# Patient Record
Sex: MALE | Race: WHITE | NOT HISPANIC OR LATINO | Employment: OTHER | URBAN - METROPOLITAN AREA
[De-identification: names, ages, dates, MRNs, and addresses within clinical notes are randomized per-mention and may not be internally consistent; named-entity substitution may affect disease eponyms.]

---

## 2022-05-18 ENCOUNTER — NURSING HOME VISIT (OUTPATIENT)
Dept: FAMILY MEDICINE CLINIC | Facility: CLINIC | Age: 82
End: 2022-05-18

## 2022-05-18 VITALS
SYSTOLIC BLOOD PRESSURE: 107 MMHG | TEMPERATURE: 96.6 F | HEIGHT: 69 IN | HEART RATE: 94 BPM | DIASTOLIC BLOOD PRESSURE: 56 MMHG | WEIGHT: 128 LBS | BODY MASS INDEX: 18.96 KG/M2

## 2022-05-18 DIAGNOSIS — K21.9 CHRONIC GERD: ICD-10-CM

## 2022-05-18 DIAGNOSIS — D50.8 OTHER IRON DEFICIENCY ANEMIA: ICD-10-CM

## 2022-05-18 DIAGNOSIS — E78.5 DYSLIPIDEMIA: ICD-10-CM

## 2022-05-18 DIAGNOSIS — D63.8 ANEMIA IN OTHER CHRONIC DISEASES CLASSIFIED ELSEWHERE: ICD-10-CM

## 2022-05-18 DIAGNOSIS — F03.90 DEMENTIA WITHOUT BEHAVIORAL DISTURBANCE, UNSPECIFIED DEMENTIA TYPE (HCC): ICD-10-CM

## 2022-05-18 DIAGNOSIS — I10 PRIMARY HYPERTENSION: Primary | ICD-10-CM

## 2022-05-18 DIAGNOSIS — I73.9 PAD (PERIPHERAL ARTERY DISEASE) (HCC): ICD-10-CM

## 2022-05-18 DIAGNOSIS — Z78.9 TAKES DIETARY SUPPLEMENTS: ICD-10-CM

## 2022-05-18 DIAGNOSIS — N18.9 CHRONIC KIDNEY DISEASE, UNSPECIFIED CKD STAGE: ICD-10-CM

## 2022-05-18 DIAGNOSIS — F32.89 OTHER DEPRESSION: ICD-10-CM

## 2022-05-18 PROBLEM — D64.9 ABSOLUTE ANEMIA: Status: ACTIVE | Noted: 2022-05-18

## 2022-05-18 PROBLEM — F32.A DEPRESSION: Status: ACTIVE | Noted: 2022-05-18

## 2022-05-18 PROBLEM — D64.9 ABSOLUTE ANEMIA: Status: RESOLVED | Noted: 2022-05-18 | Resolved: 2022-05-18

## 2022-05-18 PROCEDURE — 99326 PR DOMICIL/REST HOME NEW PT HI-MOD SEVER 45 MINUTES: CPT | Performed by: FAMILY MEDICINE

## 2022-05-18 RX ORDER — LISINOPRIL 2.5 MG/1
2.5 TABLET ORAL DAILY
Qty: 90 TABLET | Refills: 3
Start: 2022-05-18

## 2022-05-18 RX ORDER — CILOSTAZOL 50 MG/1
50 TABLET ORAL 2 TIMES DAILY
Qty: 60 TABLET | Refills: 0
Start: 2022-05-18

## 2022-05-18 RX ORDER — FERROUS SULFATE 325(65) MG
325 TABLET ORAL
Qty: 90 TABLET | Refills: 0
Start: 2022-05-18

## 2022-05-18 RX ORDER — OMEPRAZOLE 10 MG/1
10 CAPSULE, DELAYED RELEASE ORAL DAILY
Qty: 30 CAPSULE | Refills: 0
Start: 2022-05-18

## 2022-05-18 RX ORDER — ATORVASTATIN CALCIUM 40 MG/1
40 TABLET, FILM COATED ORAL DAILY
Qty: 90 TABLET | Refills: 2
Start: 2022-05-18 | End: 2023-02-12

## 2022-05-18 RX ORDER — AMLODIPINE BESYLATE 5 MG/1
5 TABLET ORAL DAILY
Qty: 30 TABLET | Refills: 0
Start: 2022-05-02 | End: 2022-06-01

## 2022-05-18 RX ORDER — FOLIC ACID 1 MG/1
1 TABLET ORAL DAILY
Qty: 90 TABLET | Refills: 3
Start: 2022-05-18

## 2022-05-18 NOTE — PROGRESS NOTES
Nursing Home Visit    NAME: Jose De Jesus Wise  AGE: 80 y o  SEX: male [id number]    DATE OF ENCOUNTER: 5/18/2022    Nursing home/assisted living [address]: [address]  Patient room number: 110  Code status:DNR      Assessment and Plan     Problem List Items Addressed This Visit        Digestive    Chronic GERD     Patient denies any symptoms currently  Continue Omeprazole 10 mg daily  Relevant Medications    omeprazole (PriLOSEC) 10 mg delayed release capsule       Cardiovascular and Mediastinum    Primary hypertension - Primary     Well controlled  Continue current regimen with amlodipine 5 mg qd and lisinopril 2 5 mg qd  Relevant Medications    lisinopril (ZESTRIL) 2 5 mg tablet    amLODIPine (NORVASC) 5 mg tablet    PAD (peripheral artery disease) ([facility])     No claudication currently  Continue cilostazol           Relevant Medications    cilostazol (PLETAL) 50 mg tablet       Nervous and Auditory    Dementia without behavioral disturbance ([facility])     Patient was diagnosed with dementia as per wife  No record in chart  Will evaluate further next visit  TSH and B12 ordered today  Relevant Medications    sertraline (Zoloft) 50 mg tablet       Genitourinary    Chronic kidney disease     No results found for: EGFR, CREATININE   Will order CMP today  CKD was noted in [facility] chart  Other    RESOLVED: Anemia in other chronic diseases classified elsewhere    Relevant Medications    folic acid ( Folic Acid) 1 mg tablet    ferrous sulfate (FeroSul) 325 (65 Fe) mg tablet    RESOLVED: Absolute anemia    Relevant Medications    folic acid (KP Folic Acid) 1 mg tablet    ferrous sulfate (FeroSul) 325 (65 Fe) mg tablet    Depression     Stable    Continue Sertraline 50 mg qd           Relevant Medications    sertraline (Zoloft) 50 mg tablet    Takes dietary supplements    Relevant Medications    folic acid (KP Folic Acid) 1 mg tablet    Dyslipidemia    Relevant Medications atorvastatin (LIPITOR) 40 mg tablet          No orders of the defined types were placed in this encounter  Chief Complaint     Headache and left eye pain    History of Present Illness     Patient is seen today during nursing home visit  He was transferred of care to [facility] on 5/2/2022  He was admitted to [place] from 4/14/22 - 4/20/2022 for sepsis 2/2 to [facility]  He has Hx of HTN, CKD, chronic diastolic HF, AAA, PAD, TIA, CAD s/p bypass graft, HLD, depression per chart review  His wife reports he fell and hit his head 2 or 3 months ago when CT head showed minor hemorrhage  Today, he c/o HA on left forehead around 4pm every day, pain in left eye for which he started ciprofloxacin eye drop yesterday  He ambulates with his walker, doing PT  He states the room is nice, the food here is excellent  However, he states he wants to go home to help his wife and said he wanted to talk to his PT, Nik Jeter to get him out of here  He is able to recall that he lived with his wife whose name is [address] [place] [address]  The following portions of the patient's history were reviewed and updated as appropriate: allergies, current medications, past family history, past medical history, past social history, past surgical history and problem list     Review of Systems     Review of Systems   Constitutional: Negative for appetite change and fever  HENT: Negative for rhinorrhea and trouble swallowing  Eyes: Positive for pain (left eye)  Negative for discharge and visual disturbance  Respiratory: Negative for cough and shortness of breath  Cardiovascular: Positive for leg swelling (left foot)  Negative for chest pain  Gastrointestinal: Negative for abdominal pain  Genitourinary: Negative for difficulty urinating  Musculoskeletal: Negative for arthralgias and gait problem  Neurological: Positive for headaches (left forehead)  Negative for weakness  Hematological: Does not bruise/bleed easily     Psychiatric/Behavioral: Negative for agitation, confusion and sleep disturbance  Active Problem List     Patient Active Problem List   Diagnosis    Primary hypertension    Chronic GERD    Depression    Takes dietary supplements    PAD (peripheral artery disease) (HCC)    Dyslipidemia    Chronic kidney disease    Dementia without behavioral disturbance (HCC)    Iron deficiency anemia secondary to inadequate dietary iron intake         Current Medications   Medications reviewed and updated in facility chart  Current Outpatient Medications:     amLODIPine (NORVASC) 5 mg tablet, Take 1 tablet (5 mg total) by mouth in the morning , Disp: 30 tablet, Rfl: 0    atorvastatin (LIPITOR) 40 mg tablet, Take 1 tablet (40 mg total) by mouth in the morning , Disp: 90 tablet, Rfl: 2    cilostazol (PLETAL) 50 mg tablet, Take 1 tablet (50 mg total) by mouth in the morning and 1 tablet (50 mg total) in the evening , Disp: 60 tablet, Rfl: 0    ferrous sulfate (FeroSul) 325 (65 Fe) mg tablet, Take 1 tablet (325 mg total) by mouth daily with breakfast, Disp: 90 tablet, Rfl: 0    folic acid (KP Folic Acid) 1 mg tablet, Take 1 tablet (1 mg total) by mouth in the morning , Disp: 90 tablet, Rfl: 3    lisinopril (ZESTRIL) 2 5 mg tablet, Take 1 tablet (2 5 mg total) by mouth in the morning , Disp: 90 tablet, Rfl: 3    omeprazole (PriLOSEC) 10 mg delayed release capsule, Take 1 capsule (10 mg total) by mouth in the morning , Disp: 30 capsule, Rfl: 0    sertraline (Zoloft) 50 mg tablet, Take 1 tablet (50 mg total) by mouth in the morning , Disp: 30 tablet, Rfl: 0     Objective     /56   Pulse 94   Temp (!) 96 6 °F (35 9 °C)   Ht 5' 9" (1 753 m)   Wt 58 1 kg (128 lb)   BMI 18 90 kg/m²     Physical Exam  Constitutional:       General: He is not in acute distress  Appearance: Normal appearance  He is well-developed  He is not ill-appearing  HENT:      Head: Normocephalic  Comments: A small abrasion on mid-forehead   Small laceration left temporal head with no active bleeding  Right Ear: External ear normal       Left Ear: External ear normal       Nose: Nose normal    Eyes:      Pupils: Pupils are equal, round, and reactive to light  Cardiovascular:      Rate and Rhythm: Normal rate and regular rhythm  Pulses: Normal pulses  Heart sounds: Normal heart sounds  Pulmonary:      Effort: Pulmonary effort is normal       Breath sounds: Normal breath sounds  No rales  Abdominal:      General: There is no distension  Palpations: Abdomen is soft  Tenderness: There is no abdominal tenderness  Musculoskeletal:         General: Swelling (mild swelling in left foot with no warmth, erythema, nor tenderness) present  Normal range of motion  Right lower leg: No edema  Left lower leg: No edema  Skin:     General: Skin is warm  Findings: No bruising or rash  Neurological:      General: No focal deficit present  Mental Status: He is alert  Motor: No weakness  Comments: Oriented to person and place, no time  Left pupil slightly dilated compared with the right (3mm vs 2 mm), reactive to light  Psychiatric:         Behavior: Behavior normal          Pertinent Laboratory/Diagnostic Studies:  No results found

## 2022-05-18 NOTE — LETTER
May 18, 2022     ron MIRAMONTES    Patient: Gayle Estrella   YOB: 1940   Date of Visit: 5/18/2022         Thank you for referring Gayle Estrella to me for evaluation  Below are my notes for this consultation  If you have questions, please do not hesitate to call me  I look forward to following your patient along with you  Sincerely,        Tje Jensen MD        CC: No Recipients  Tej Jensen MD  5/18/2022  3:06 PM  Attested  Nursing Home Visit    NAME: Gayle Estrella  AGE: 80 y o  SEX: male [id number]    DATE OF ENCOUNTER: 5/18/2022    Nursing home/assisted living name: [address] [facility]  Patient room number: 110  Code status:DNR      Assessment and Plan     Problem List Items Addressed This Visit        Digestive    Chronic GERD     Patient denies any symptoms currently  Continue Omeprazole 10 mg daily  Relevant Medications    omeprazole (PriLOSEC) 10 mg delayed release capsule       Cardiovascular and Mediastinum    Primary hypertension - Primary     Well controlled  Continue current regimen with amlodipine 5 mg qd and lisinopril 2 5 mg qd  Relevant Medications    lisinopril (ZESTRIL) 2 5 mg tablet    amLODIPine (NORVASC) 5 mg tablet    PAD (peripheral artery disease) ([facility])     No claudication currently  Continue cilostazol           Relevant Medications    cilostazol (PLETAL) 50 mg tablet       Nervous and Auditory    Dementia without behavioral disturbance ([facility])     Patient was diagnosed with dementia as per wife  No record in chart  Will evaluate further next visit  TSH and B12 ordered today  Relevant Medications    sertraline (Zoloft) 50 mg tablet       Genitourinary    Chronic kidney disease     No results found for: EGFR, CREATININE   Will order CMP today  CKD was noted in [facility] chart                Other    RESOLVED: Anemia in other chronic diseases classified elsewhere    Relevant Medications    folic acid ( Folic Acid) 1 mg tablet    ferrous sulfate (FeroSul) 325 (65 Fe) mg tablet    RESOLVED: Absolute anemia    Relevant Medications    folic acid ( Folic Acid) 1 mg tablet    ferrous sulfate (FeroSul) 325 (65 Fe) mg tablet    Depression     Stable  Continue Sertraline 50 mg qd           Relevant Medications    sertraline (Zoloft) 50 mg tablet    Takes dietary supplements    Relevant Medications    folic acid ( Folic Acid) 1 mg tablet    Dyslipidemia    Relevant Medications    atorvastatin (LIPITOR) 40 mg tablet          No orders of the defined types were placed in this encounter  Chief Complaint     Headache and left eye pain    History of Present Illness     Patient is seen today during nursing home visit  He was transferred of care to [facility] on 5/2/2022  He was admitted to [place] from 4/14/22 - 4/20/2022 for sepsis 2/2 to [facility]  He has Hx of HTN, CKD, chronic diastolic HF, AAA, PAD, TIA, CAD s/p bypass graft, HLD, depression per chart review  His wife reports he fell and hit his head 2 or 3 months ago when CT head showed minor hemorrhage  Today, he c/o HA on left forehead around 4pm every day, pain in left eye for which he started ciprofloxacin eye drop yesterday  He ambulates with his walker, doing PT  He states the room is nice, the food here is excellent  However, he states he wants to go home to help his wife and said he wanted to talk to his PT, Maxi Perkins to get him out of here  He is able to recall that he lived with his wife whose name is [address] [place] [address] in [place]  The following portions of the patient's history were reviewed and updated as appropriate: allergies, current medications, past family history, past medical history, past social history, past surgical history and problem list     Review of Systems     Review of Systems   Constitutional: Negative for appetite change and fever  HENT: Negative for rhinorrhea and trouble swallowing  Eyes: Positive for pain (left eye)  Negative for discharge and visual disturbance     Respiratory: Negative for cough and shortness of breath  Cardiovascular: Positive for leg swelling (left foot)  Negative for chest pain  Gastrointestinal: Negative for abdominal pain  Genitourinary: Negative for difficulty urinating  Musculoskeletal: Negative for arthralgias and gait problem  Neurological: Positive for headaches (left forehead)  Negative for weakness  Hematological: Does not bruise/bleed easily  Psychiatric/Behavioral: Negative for agitation, confusion and sleep disturbance  Active Problem List     Patient Active Problem List   Diagnosis    Primary hypertension    Chronic GERD    Depression    Takes dietary supplements    PAD (peripheral artery disease) (HCC)    Dyslipidemia    Chronic kidney disease    Dementia without behavioral disturbance (HCC)    Iron deficiency anemia secondary to inadequate dietary iron intake         Current Medications   Medications reviewed and updated in facility chart        Current Outpatient Medications:     amLODIPine (NORVASC) 5 mg tablet, Take 1 tablet (5 mg total) by mouth in the morning , Disp: 30 tablet, Rfl: 0    atorvastatin (LIPITOR) 40 mg tablet, Take 1 tablet (40 mg total) by mouth in the morning , Disp: 90 tablet, Rfl: 2    cilostazol (PLETAL) 50 mg tablet, Take 1 tablet (50 mg total) by mouth in the morning and 1 tablet (50 mg total) in the evening , Disp: 60 tablet, Rfl: 0    ferrous sulfate (FeroSul) 325 (65 Fe) mg tablet, Take 1 tablet (325 mg total) by mouth daily with breakfast, Disp: 90 tablet, Rfl: 0    folic acid (KP Folic Acid) 1 mg tablet, Take 1 tablet (1 mg total) by mouth in the morning , Disp: 90 tablet, Rfl: 3    lisinopril (ZESTRIL) 2 5 mg tablet, Take 1 tablet (2 5 mg total) by mouth in the morning , Disp: 90 tablet, Rfl: 3    omeprazole (PriLOSEC) 10 mg delayed release capsule, Take 1 capsule (10 mg total) by mouth in the morning , Disp: 30 capsule, Rfl: 0    sertraline (Zoloft) 50 mg tablet, Take 1 tablet (50 mg total) by mouth in the morning , Disp: 30 tablet, Rfl: 0     Objective     /56   Pulse 94   Temp (!) 96 6 °F (35 9 °C)   Ht 5' 9" (1 753 m)   Wt 58 1 kg (128 lb)   BMI 18 90 kg/m²     Physical Exam  Constitutional:       General: He is not in acute distress  Appearance: Normal appearance  He is well-developed  He is not ill-appearing  HENT:      Head: Normocephalic  Comments: A small abrasion on mid-forehead  Small laceration left temporal head with no active bleeding  Right Ear: External ear normal       Left Ear: External ear normal       Nose: Nose normal    Eyes:      Pupils: Pupils are equal, round, and reactive to light  Cardiovascular:      Rate and Rhythm: Normal rate and regular rhythm  Pulses: Normal pulses  Heart sounds: Normal heart sounds  Pulmonary:      Effort: Pulmonary effort is normal       Breath sounds: Normal breath sounds  No rales  Abdominal:      General: There is no distension  Palpations: Abdomen is soft  Tenderness: There is no abdominal tenderness  Musculoskeletal:         General: Swelling (mild swelling in left foot with no warmth, erythema, nor tenderness) present  Normal range of motion  Right lower leg: No edema  Left lower leg: No edema  Skin:     General: Skin is warm  Findings: No bruising or rash  Neurological:      General: No focal deficit present  Mental Status: He is alert  Motor: No weakness  Comments: Oriented to person and place, no time  Left pupil slightly dilated compared with the right (3mm vs 2 mm), reactive to light  Psychiatric:         Behavior: Behavior normal          Pertinent Laboratory/Diagnostic Studies:  No results found  Attestation signed by Hernando Kapoor MD at 5/18/2022  4:56 PM:  I discussed case with the resident and reviewed resident note  I was present at assisted living and supervised the resident   I agree with the resident management as it was presented to me  I saw and examined the new patient, discussed on the phone with wife Teagan Rinaldi  Patient not sure when moved into AL facility, complaining about frontal headache left side and states wants to talk to PT about going home to help wife  Per wife patient was hospitalized at [place] [address] recently after fall and had head CT scan, went to [place] and now here for long term  Records not available to review or labs  Able to ambulate with walker  Increased Tylenol to BID and ordered labs, will obtain records from recent hospitalization for review at next week follow up

## 2022-05-18 NOTE — ASSESSMENT & PLAN NOTE
Patient was diagnosed with dementia as per wife  No record in chart  Will evaluate further next visit  TSH and B12 ordered today

## 2022-05-18 NOTE — LETTER
May 18, 2022     [facility]    Patient: Bettina Rowland   YOB: 1940   Date of Visit: 5/18/2022       Dear Stephanie Horn: Thank you for referring Bettina Rowland to me for evaluation  Below are my notes for this consultation  If you have questions, please do not hesitate to call me  I look forward to following your patient along with you  Sincerely,        Dick Marshall MD        CC: No Recipients  Dick Marshall MD  5/18/2022  3:06 PM  Attested  Nursing Home Visit    NAME: Bettina Rowland  AGE: 80 y o  SEX: male [id number]    DATE OF ENCOUNTER: 5/18/2022    Nursing home/assisted living name: [address]  Patient room number: 110  Code status:DNR      Assessment and Plan     Problem List Items Addressed This Visit        Digestive    Chronic GERD     Patient denies any symptoms currently  Continue Omeprazole 10 mg daily  Relevant Medications    omeprazole (PriLOSEC) 10 mg delayed release capsule       Cardiovascular and Mediastinum    Primary hypertension - Primary     Well controlled  Continue current regimen with amlodipine 5 mg qd and lisinopril 2 5 mg qd  Relevant Medications    lisinopril (ZESTRIL) 2 5 mg tablet    amLODIPine (NORVASC) 5 mg tablet    PAD (peripheral artery disease) (HCC)     No claudication currently  Continue cilostazol           Relevant Medications    cilostazol (PLETAL) 50 mg tablet       Nervous and Auditory    Dementia without behavioral disturbance ([facility]CC)     Patient was diagnosed with dementia as per wife  No record in chart  Will evaluate further next visit  TSH and B12 ordered today  Relevant Medications    sertraline (Zoloft) 50 mg tablet       Genitourinary    Chronic kidney disease     No results found for: EGFR, CREATININE   Will order CMP today  CKD was noted in [facility] chart                Other    RESOLVED: Anemia in other chronic diseases classified elsewhere    Relevant Medications    folic acid ( Folic Acid) 1 mg tablet ferrous sulfate (FeroSul) 325 (65 Fe) mg tablet    RESOLVED: Absolute anemia    Relevant Medications    folic acid ( Folic Acid) 1 mg tablet    ferrous sulfate (FeroSul) 325 (65 Fe) mg tablet    Depression     Stable  Continue Sertraline 50 mg qd           Relevant Medications    sertraline (Zoloft) 50 mg tablet    Takes dietary supplements    Relevant Medications    folic acid ( Folic Acid) 1 mg tablet    Dyslipidemia    Relevant Medications    atorvastatin (LIPITOR) 40 mg tablet          No orders of the defined types were placed in this encounter  Chief Complaint     Headache and left eye pain    History of Present Illness     Patient is seen today during nursing home visit  He was transferred of care to [facility] on 5/2/2022  He was admitted [facility] from 4/14/22 - 4/20/2022 for sepsis 2/2 to [facility]  He has Hx of HTN, CKD, chronic diastolic HF, AAA, PAD, TIA, CAD s/p bypass graft, HLD, depression per chart review  His wife reports he fell and hit his head 2 or 3 months ago when CT head showed minor hemorrhage  Today, he c/o HA on left forehead around 4pm every day, pain in left eye for which he started ciprofloxacin eye drop yesterday  He ambulates with his walker, doing PT  He states the room is nice, the food here is excellent  However, he states he wants to go home to help his wife and said he wanted to talk to his PT, Brian Clifford to get him out of here  He is able to recall that he lived with his wife whose name is [address] [place] [address] in [place]  The following portions of the patient's history were reviewed and updated as appropriate: allergies, current medications, past family history, past medical history, past social history, past surgical history and problem list     Review of Systems     Review of Systems   Constitutional: Negative for appetite change and fever  HENT: Negative for rhinorrhea and trouble swallowing  Eyes: Positive for pain (left eye)  Negative for discharge and visual disturbance  Respiratory: Negative for cough and shortness of breath  Cardiovascular: Positive for leg swelling (left foot)  Negative for chest pain  Gastrointestinal: Negative for abdominal pain  Genitourinary: Negative for difficulty urinating  Musculoskeletal: Negative for arthralgias and gait problem  Neurological: Positive for headaches (left forehead)  Negative for weakness  Hematological: Does not bruise/bleed easily  Psychiatric/Behavioral: Negative for agitation, confusion and sleep disturbance  Active Problem List     Patient Active Problem List   Diagnosis    Primary hypertension    Chronic GERD    Depression    Takes dietary supplements    PAD (peripheral artery disease) (HCC)    Dyslipidemia    Chronic kidney disease    Dementia without [facility] disturbance ([facility])    Iron deficiency anemia secondary to inadequate dietary iron intake         Current Medications   Medications reviewed and updated in facility chart        Current Outpatient Medications:     amLODIPine (NORVASC) 5 mg tablet, Take 1 tablet (5 mg total) by mouth in the morning , Disp: 30 tablet, Rfl: 0    atorvastatin (LIPITOR) 40 mg tablet, Take 1 tablet (40 mg total) by mouth in the morning , Disp: 90 tablet, Rfl: 2    cilostazol (PLETAL) 50 mg tablet, Take 1 tablet (50 mg total) by mouth in the morning and 1 tablet (50 mg total) in the evening , Disp: 60 tablet, Rfl: 0    ferrous sulfate (FeroSul) 325 (65 Fe) mg tablet, Take 1 tablet (325 mg total) by mouth daily with breakfast, Disp: 90 tablet, Rfl: 0    folic acid (KP Folic Acid) 1 mg tablet, Take 1 tablet (1 mg total) by mouth in the morning , Disp: 90 tablet, Rfl: 3    lisinopril (ZESTRIL) 2 5 mg tablet, Take 1 tablet (2 5 mg total) by mouth in the morning , Disp: 90 tablet, Rfl: 3    omeprazole (PriLOSEC) 10 mg delayed release capsule, Take 1 capsule (10 mg total) by mouth in the morning , Disp: 30 capsule, Rfl: 0    sertraline (Zoloft) 50 mg tablet, Take 1 tablet (50 mg total) by mouth in the morning , Disp: 30 tablet, Rfl: 0     Objective     /56   Pulse 94   Temp (!) 96 6 °F (35 9 °C)   Ht 5' 9" (1 753 m)   Wt 58 1 kg (128 lb)   BMI 18 90 kg/m²     Physical Exam  Constitutional:       General: He is not in acute distress  Appearance: Normal appearance  He is well-developed  He is not ill-appearing  HENT:      Head: Normocephalic  Comments: A small abrasion on mid-forehead  Small laceration left temporal head with no active bleeding  Right Ear: External ear normal       Left Ear: External ear normal       Nose: Nose normal    Eyes:      Pupils: Pupils are equal, round, and reactive to light  Cardiovascular:      Rate and Rhythm: Normal rate and regular rhythm  Pulses: Normal pulses  Heart sounds: Normal heart sounds  Pulmonary:      Effort: Pulmonary effort is normal       Breath sounds: Normal breath sounds  No rales  Abdominal:      General: There is no distension  Palpations: Abdomen is soft  Tenderness: There is no abdominal tenderness  Musculoskeletal:         General: Swelling (mild swelling in left foot with no warmth, erythema, nor tenderness) present  Normal range of motion  Right lower leg: No edema  Left lower leg: No edema  Skin:     General: Skin is warm  Findings: No bruising or rash  Neurological:      General: No focal deficit present  Mental Status: He is alert  Motor: No weakness  Comments: Oriented to person and place, no time  Left pupil slightly dilated compared with the right (3mm vs 2 mm), reactive to light  Psychiatric:         Behavior: Behavior normal          Pertinent Laboratory/Diagnostic Studies:  No results found  Attestation signed by Jazmine Johnson MD at 5/18/2022  4:56 PM:  I discussed case with the resident and reviewed resident note  I was present at assisted living and supervised the resident   I agree with the resident management as it was presented to me  I saw and examined the new patient, discussed on the phone with wife Zora Johnson  Patient not sure when moved into AL facility, complaining about frontal headache left side and states wants to talk to PT about going home to help wife  Per wife patient was hospitalized at [place] [address] recently after fall and had head CT scan, went to [facility] and now here for long term  Records not available to review or labs  Able to ambulate with walker  Increased Tylenol to BID and ordered labs, will obtain records from recent hospitalization for review at next week follow up

## 2022-05-25 ENCOUNTER — NURSING HOME VISIT (OUTPATIENT)
Dept: FAMILY MEDICINE CLINIC | Facility: CLINIC | Age: 82
End: 2022-05-25

## 2022-05-25 VITALS — TEMPERATURE: 96.6 F | DIASTOLIC BLOOD PRESSURE: 67 MMHG | SYSTOLIC BLOOD PRESSURE: 122 MMHG | HEART RATE: 71 BPM

## 2022-05-25 DIAGNOSIS — D63.8 ANEMIA OF CHRONIC DISEASE: ICD-10-CM

## 2022-05-25 DIAGNOSIS — N18.9 CHRONIC KIDNEY DISEASE, UNSPECIFIED CKD STAGE: ICD-10-CM

## 2022-05-25 DIAGNOSIS — R51.9 CHRONIC NONINTRACTABLE HEADACHE, UNSPECIFIED HEADACHE TYPE: Primary | ICD-10-CM

## 2022-05-25 DIAGNOSIS — I73.9 PAD (PERIPHERAL ARTERY DISEASE) (HCC): ICD-10-CM

## 2022-05-25 DIAGNOSIS — I10 PRIMARY HYPERTENSION: ICD-10-CM

## 2022-05-25 DIAGNOSIS — K21.9 CHRONIC GERD: ICD-10-CM

## 2022-05-25 DIAGNOSIS — G89.29 CHRONIC NONINTRACTABLE HEADACHE, UNSPECIFIED HEADACHE TYPE: Primary | ICD-10-CM

## 2022-05-25 DIAGNOSIS — R26.2 AMBULATORY DYSFUNCTION: ICD-10-CM

## 2022-05-25 DIAGNOSIS — F03.90 DEMENTIA WITHOUT BEHAVIORAL DISTURBANCE, UNSPECIFIED DEMENTIA TYPE (HCC): ICD-10-CM

## 2022-05-25 DIAGNOSIS — D50.8 IRON DEFICIENCY ANEMIA SECONDARY TO INADEQUATE DIETARY IRON INTAKE: ICD-10-CM

## 2022-05-25 DIAGNOSIS — E78.5 DYSLIPIDEMIA: ICD-10-CM

## 2022-05-25 DIAGNOSIS — F32.89 OTHER DEPRESSION: ICD-10-CM

## 2022-05-25 PROBLEM — D63.1 ANEMIA IN CKD (CHRONIC KIDNEY DISEASE): Status: ACTIVE | Noted: 2022-05-18

## 2022-05-25 PROCEDURE — 99336 PR DOM/R-HOME E/M EST PT MOD HI SEVERITY 40 MINUTES: CPT | Performed by: FAMILY MEDICINE

## 2022-05-25 RX ORDER — ACETAMINOPHEN 500 MG
500 TABLET ORAL 2 TIMES DAILY
COMMUNITY

## 2022-05-25 NOTE — LETTER
May 25, 2022     [facility]    Patient: Lamonte Dwyer   YOB: 1940   Date of Visit: 5/25/2022       Dear Dr Jose Ma: Thank you for referring Lamonte Dwyer to me for evaluation  Below are my notes for this consultation  If you have questions, please do not hesitate to call me  I look forward to following your patient along with you  Sincerely,        Wally Boykin DO        CC: No Recipients  Wally Boykin DO  5/25/2022 10:40 AM  Attested Addendum  Nursing Home Visit    NAME: Lamonte Dwyer  AGE: 80 y o  SEX: male [id number]    DATE OF ENCOUNTER: 5/25/2022    Nursing home/assisted living name: [address]  Patient room number: 110  Code status: DNR (POLST available)      Assessment and Plan         Problem List Items Addressed This Visit        Digestive    Chronic GERD     Patient denies any symptoms currently  Continue Omeprazole 10 mg daily  Cardiovascular and Mediastinum    Primary hypertension     Well controlled  BP today at 122/67  Continue current regimen with amlodipine 5 mg qd and lisinopril 2 5 mg qd  PAD (peripheral artery disease) ([facility])     No claudication currently  Continue Cilostazol 100mg every 12 hours               Nervous and Auditory    Dementia without behavioral disturbance ([facility] [address] )     Patient was diagnosed with dementia as per wife  No record in chart    Alert and Oriented x 3 on exam today  Awaiting blood work results                Genitourinary    CKD (chronic kidney disease)     CKD per transition of care note from [facility] but no lab work available to review in chart  Awaiting CMP results  Avoid nephrotoxic agents and will need to renally dose medications in the future              Other    Depression     Mood Stable  Continue Sertraline 50 mg qd           Dyslipidemia     Awaiting Lipid panel  Continue Atorvastatin 40mg q daily           Iron deficiency anemia secondary to inadequate dietary iron intake    Chronic nonintractable headache - Primary     Reports chronic left frontal headache, ongoing for at least one month  Denies any associated dizziness or blurry vision and left pupil enlarged compared to right  Reported fall with head injury per wife, awaiting records from [facility]  Continue Tylenol BID  If have associated neuro deficits or no recent CT imaging in records will need CT head for further evaluation             Anemia of chronic disease     Awaiting CBC results  Will continue Ferrous Sulfate daily           Ambulatory dysfunction     Uses walker daily  No recent falls or injuries  Continue PT while in facility                 No orders of the defined types were placed in this encounter  Chief Complaint     Chief Complaint   Patient presents with    Well Check     Nursing Home Visit       History of Present Illness     HPI    Patient is a 79 yo male with hx of Dementia, HTN, CKD, HLD and PVD who is evaluated today for routine nursing home visit  He was last evaluated by Dr Mckenna Yañez on 5/18/22  He is a new patient to [facility] and was evaluated for care  Last visit, no records were available from [facility] and lab work was ordered  Today, no lab work is available and no records from [facility] was attained  Patient reports he is doing well overall  He desires to go home today and asks repeatedly if he goes home and his wife is unable to care for him what will happen  He reports continued left sided frontal headache  Denies any associated blurry vision or associated nausea  Eating well and denies any recent falls or injuries  Working with PT regularly  The following portions of the patient's history were reviewed and updated as appropriate: allergies, current medications, past family history, past medical history, past social history, past surgical history and problem list     Review of Systems     Review of Systems   Constitutional: Negative for fatigue and fever  HENT: Negative for congestion and rhinorrhea      Eyes: Negative for visual disturbance  Respiratory: Negative for cough and shortness of breath  Cardiovascular: Negative for chest pain and palpitations  Gastrointestinal: Negative for abdominal pain, constipation, diarrhea and vomiting  Genitourinary: Negative for difficulty urinating  Musculoskeletal: Positive for gait problem (use walker daily)  Negative for back pain, neck pain and neck stiffness  Neurological: Positive for headaches  Negative for dizziness, weakness and light-headedness  Active Problem List     Patient Active Problem List   Diagnosis    Primary hypertension    Chronic GERD    Depression    Takes dietary supplements    PAD (peripheral artery disease) (HCC)    Dyslipidemia    CKD (chronic kidney disease)    [facility]    Iron deficiency anemia secondary to inadequate dietary iron intake    Chronic nonintractable headache    Anemia of chronic disease    Ambulatory dysfunction         Current Medications   Medications reviewed and updated in facility chart  Current Outpatient Medications:     acetaminophen (TYLENOL) 500 mg tablet, Take 500 mg by mouth in the morning and 500 mg before bedtime  , Disp: , Rfl:     ciprofloxacin (CILOXAN) 0 3 % ophthalmic ointment, 4 (four) times a day, Disp: , Rfl:     amLODIPine (NORVASC) 5 mg tablet, Take 1 tablet (5 mg total) by mouth in the morning , Disp: 30 tablet, Rfl: 0    atorvastatin (LIPITOR) 40 mg tablet, Take 1 tablet (40 mg total) by mouth in the morning , Disp: 90 tablet, Rfl: 2    cilostazol (PLETAL) 50 mg tablet, Take 1 tablet (50 mg total) by mouth in the morning and 1 tablet (50 mg total) in the evening , Disp: 60 tablet, Rfl: 0    ferrous sulfate (FeroSul) 325 (65 Fe) mg tablet, Take 1 tablet (325 mg total) by mouth daily with breakfast, Disp: 90 tablet, Rfl: 0    folic acid (KP Folic Acid) 1 mg tablet, Take 1 tablet (1 mg total) by mouth in the morning , Disp: 90 tablet, Rfl: 3   lisinopril (ZESTRIL) 2 5 mg tablet, Take 1 tablet (2 5 mg total) by mouth in the morning , Disp: 90 tablet, Rfl: 3    omeprazole (PriLOSEC) 10 mg delayed release capsule, Take 1 capsule (10 mg total) by mouth in the morning , Disp: 30 capsule, Rfl: 0    sertraline (Zoloft) 50 mg tablet, Take 1 tablet (50 mg total) by mouth in the morning , Disp: 30 tablet, Rfl: 0     Objective     /67   Pulse 71   Temp (!) 96 6 °F (35 9 °C)     Physical Exam  Vitals reviewed  Constitutional:       General: He is not in acute distress  Appearance: He is not ill-appearing  Eyes:     Cardiovascular:      Rate and Rhythm: Normal rate and regular rhythm  Pulmonary:      Effort: Pulmonary effort is normal       Breath sounds: No wheezing or rales  Abdominal:      Palpations: Abdomen is soft  Tenderness: There is no abdominal tenderness  Musculoskeletal:      Cervical back: Neck supple  Skin:     General: Skin is warm  Neurological:      Mental Status: He is alert and oriented to person, place, and time  Psychiatric:         Mood and Affect: Mood normal          Behavior: Behavior normal                        Attestation signed by Florinda Sheffield MD at 5/25/2022 11:58 AM:  I discussed case with the resident and reviewed resident note  I was present at assisted living and supervised the resident  I agree with the resident management as it was presented to me  Patient appears less anxious, still with frontal left side headache  We still waiting for previous records and labs ordered at last visit

## 2022-05-25 NOTE — ASSESSMENT & PLAN NOTE
Well controlled  BP today at 122/67  Continue current regimen with amlodipine 5 mg qd and lisinopril 2 5 mg qd

## 2022-05-25 NOTE — PROGRESS NOTES
Nursing Home Visit    NAME: Reed Naranjo  AGE: 80 y o  SEX: male [id number]    DATE OF ENCOUNTER: 5/25/2022    [address]ursing home/assisted living [address]  Patient room number: 110  Code status: DNR (POLST available)      Assessment and Plan         Problem List Items Addressed This Visit        Digestive    Chronic GERD     Patient denies any symptoms currently  Continue Omeprazole 10 mg daily  Cardiovascular and Mediastinum    Primary hypertension     Well controlled  BP today at 122/67  Continue current regimen with amlodipine 5 mg qd and lisinopril 2 5 mg qd  PAD (peripheral artery disease) ([facility])     No claudication currently  Continue Cilostazol 100mg every 12 hours               Nervous and Auditory    Dementia without behavioral disturbance ([facility] [address] )     Patient was diagnosed with dementia as per wife  No record in chart    Alert and Oriented x 3 on exam today  Awaiting blood work results                Genitourinary    CKD (chronic kidney disease)     CKD per transition of care note from Son but no lab work available to review in chart  Awaiting CMP results  Avoid nephrotoxic agents and will need to renally dose medications in the future              Other    Depression     Mood Stable  Continue Sertraline 50 mg qd           Dyslipidemia     Awaiting Lipid panel  Continue Atorvastatin 40mg q daily           Iron deficiency anemia secondary to inadequate dietary iron intake    Chronic nonintractable headache - Primary     Reports chronic left frontal headache, ongoing for at least one month  Denies any associated dizziness or blurry vision and left pupil enlarged compared to right  Reported fall with head injury per wife, awaiting records from [facility]  Continue Tylenol BID  If have associated neuro deficits or no recent CT imaging in records will need CT head for further evaluation             Anemia of chronic disease     Awaiting CBC results  Will continue Ferrous Sulfate daily           Ambulatory dysfunction     Uses walker daily  No recent falls or injuries  Continue PT while in facility                 No orders of the defined types were placed in this encounter  Chief Complaint     Chief Complaint   Patient presents with    Well Check     Nursing Home Visit       History of Present Illness     HPI    Patient is a 81 yo male with hx of Dementia, HTN, CKD, HLD and PVD who is evaluated today for routine nursing home visit  He was last evaluated by Dr Viv Ospina on 5/18/22  [facility]  Last visit, no records were available from [facility] and lab work was ordered  Today, no lab work is available and no records from [facility] was attained  Patient reports he is doing well overall  He desires to go home today and asks repeatedly if he goes home and his wife is unable to care for him what will happen  He reports continued left sided frontal headache  Denies any associated blurry vision or associated nausea  Eating well and denies any recent falls or injuries  Working with PT regularly  The following portions of the patient's history were reviewed and updated as appropriate: allergies, current medications, past family history, past medical history, past social history, past surgical history and problem list     Review of Systems     Review of Systems   Constitutional: Negative for fatigue and fever  HENT: Negative for congestion and rhinorrhea  Eyes: Negative for visual disturbance  Respiratory: Negative for cough and shortness of breath  Cardiovascular: Negative for chest pain and palpitations  Gastrointestinal: Negative for abdominal pain, constipation, diarrhea and vomiting  Genitourinary: Negative for difficulty urinating  Musculoskeletal: Positive for gait problem (use walker daily)  Negative for back pain, neck pain and neck stiffness  Neurological: Positive for headaches   Negative for dizziness, weakness and light-headedness  Active Problem List     Patient Active Problem List   Diagnosis    Primary hypertension    Chronic GERD    Depression    Takes dietary supplements    PAD (peripheral artery disease) (HCC)    Dyslipidemia    CKD (chronic kidney disease)    Dementia without behavioral disturbance (HCC)    Iron deficiency anemia secondary to inadequate dietary iron intake    Chronic nonintractable headache    Anemia of chronic disease    Ambulatory dysfunction         Current Medications   Medications reviewed and updated in facility chart  Current Outpatient Medications:     acetaminophen (TYLENOL) 500 mg tablet, Take 500 mg by mouth in the morning and 500 mg before bedtime  , Disp: , Rfl:     ciprofloxacin (CILOXAN) 0 3 % ophthalmic ointment, 4 (four) times a day, Disp: , Rfl:     amLODIPine (NORVASC) 5 mg tablet, Take 1 tablet (5 mg total) by mouth in the morning , Disp: 30 tablet, Rfl: 0    atorvastatin (LIPITOR) 40 mg tablet, Take 1 tablet (40 mg total) by mouth in the morning , Disp: 90 tablet, Rfl: 2    cilostazol (PLETAL) 50 mg tablet, Take 1 tablet (50 mg total) by mouth in the morning and 1 tablet (50 mg total) in the evening , Disp: 60 tablet, Rfl: 0    ferrous sulfate (FeroSul) 325 (65 Fe) mg tablet, Take 1 tablet (325 mg total) by mouth daily with breakfast, Disp: 90 tablet, Rfl: 0    folic acid (KP Folic Acid) 1 mg tablet, Take 1 tablet (1 mg total) by mouth in the morning , Disp: 90 tablet, Rfl: 3    lisinopril (ZESTRIL) 2 5 mg tablet, Take 1 tablet (2 5 mg total) by mouth in the morning , Disp: 90 tablet, Rfl: 3    omeprazole (PriLOSEC) 10 mg delayed release capsule, Take 1 capsule (10 mg total) by mouth in the morning , Disp: 30 capsule, Rfl: 0    sertraline (Zoloft) 50 mg tablet, Take 1 tablet (50 mg total) by mouth in the morning , Disp: 30 tablet, Rfl: 0     Objective     /67   Pulse 71   Temp (!) 96 6 °F (35 9 °C)     Physical Exam  Vitals reviewed  Constitutional:       General: He is not in acute distress  Appearance: He is not ill-appearing  Eyes:     Cardiovascular:      Rate and Rhythm: Normal rate and regular rhythm  Pulmonary:      Effort: Pulmonary effort is normal       Breath sounds: No wheezing or rales  Abdominal:      Palpations: Abdomen is soft  Tenderness: There is no abdominal tenderness  Musculoskeletal:      Cervical back: Neck supple  Skin:     General: Skin is warm  Neurological:      Mental Status: He is alert and oriented to person, place, and time     Psychiatric:         Mood and Affect: Mood normal          Behavior: Behavior normal

## 2022-05-25 NOTE — ASSESSMENT & PLAN NOTE
Reports chronic left frontal headache, ongoing for at least one month  Denies any associated dizziness or blurry vision and left pupil enlarged compared to right  Reported fall with head injury per wife, awaiting records from [facility]  Continue Tylenol BID  If have associated neuro deficits or no recent CT imaging in records will need CT head for further evaluation

## 2022-05-25 NOTE — ASSESSMENT & PLAN NOTE
CKD per transition of care note from [facility] but no lab work available to review in chart  Awaiting CMP results  Avoid nephrotoxic agents and will need to renally dose medications in the future

## 2022-05-25 NOTE — ASSESSMENT & PLAN NOTE
Patient was diagnosed with dementia as per wife  No record in chart    Alert and Oriented x 3 on exam today  Awaiting blood work results

## 2022-06-03 ENCOUNTER — TELEPHONE (OUTPATIENT)
Dept: FAMILY MEDICINE CLINIC | Facility: CLINIC | Age: 82
End: 2022-06-03

## 2022-06-13 ENCOUNTER — TELEPHONE (OUTPATIENT)
Dept: FAMILY MEDICINE CLINIC | Facility: CLINIC | Age: 82
End: 2022-06-13

## 2022-06-22 ENCOUNTER — TELEPHONE (OUTPATIENT)
Dept: FAMILY MEDICINE CLINIC | Facility: CLINIC | Age: 82
End: 2022-06-22

## 2022-06-23 NOTE — TELEPHONE ENCOUNTER
I called patient wife Matilda Escobar and updated her on my last discussion with patient  She states visited  last Sunday at [address] and explained to him that she can not safely take care of him at home  Patient appeared confused to her and insisted to go back home with her  She agreed to call [facility] [place] to obtain recent hospitalization records including CT scan of head and call [address] [place] [address] at [address] to have updates on  care

## 2022-07-13 ENCOUNTER — NURSING HOME VISIT (OUTPATIENT)
Dept: FAMILY MEDICINE CLINIC | Facility: CLINIC | Age: 82
End: 2022-07-13

## 2022-07-13 VITALS
WEIGHT: 129 LBS | HEIGHT: 67 IN | BODY MASS INDEX: 20.25 KG/M2 | SYSTOLIC BLOOD PRESSURE: 128 MMHG | TEMPERATURE: 97.1 F | DIASTOLIC BLOOD PRESSURE: 66 MMHG | HEART RATE: 71 BPM

## 2022-07-13 DIAGNOSIS — N18.9 CHRONIC KIDNEY DISEASE, UNSPECIFIED CKD STAGE: ICD-10-CM

## 2022-07-13 DIAGNOSIS — I10 PRIMARY HYPERTENSION: ICD-10-CM

## 2022-07-13 DIAGNOSIS — E53.8 VITAMIN B12 DEFICIENCY: ICD-10-CM

## 2022-07-13 DIAGNOSIS — E78.5 DYSLIPIDEMIA: ICD-10-CM

## 2022-07-13 DIAGNOSIS — H10.9 CONJUNCTIVITIS OF LEFT EYE, UNSPECIFIED CONJUNCTIVITIS TYPE: ICD-10-CM

## 2022-07-13 DIAGNOSIS — I73.9 PAD (PERIPHERAL ARTERY DISEASE) (HCC): ICD-10-CM

## 2022-07-13 DIAGNOSIS — K21.9 CHRONIC GERD: Primary | ICD-10-CM

## 2022-07-13 DIAGNOSIS — R26.2 AMBULATORY DYSFUNCTION: ICD-10-CM

## 2022-07-13 DIAGNOSIS — D50.8 IRON DEFICIENCY ANEMIA SECONDARY TO INADEQUATE DIETARY IRON INTAKE: ICD-10-CM

## 2022-07-13 DIAGNOSIS — F03.90 DEMENTIA WITHOUT BEHAVIORAL DISTURBANCE, UNSPECIFIED DEMENTIA TYPE: ICD-10-CM

## 2022-07-13 DIAGNOSIS — F32.89 OTHER DEPRESSION: ICD-10-CM

## 2022-07-13 PROCEDURE — 99309 SBSQ NF CARE MODERATE MDM 30: CPT | Performed by: FAMILY MEDICINE

## 2022-07-13 NOTE — ASSESSMENT & PLAN NOTE
- CKD per transition of care note  - Lab work obtained 6/13/22: Cr 1 32, GFR 55  - Unclear of baseline renal function  - Avoid nephrotoxic agents and renally dose medications  - Of note, consider discontinuing lisinopril given BP at goal

## 2022-07-13 NOTE — ASSESSMENT & PLAN NOTE
- Patient reports doing well with PT sessions  - Makes it a habit of taking walks on patio and doing leg lifts in his chair 4-5x/day  - Took walk with patient, ambulates very well with walker    Plan:  - Continue PT  - Continue ambulation with walker

## 2022-07-13 NOTE — ASSESSMENT & PLAN NOTE
- BP today 128/66  - BP at goal <150/90  - Pt denies headache, CP, SOB  - Tolerating current medications    Plan:  - Continue amlodipine 5mg QD & lisinopril 2 5mg QD  - Consider discontinuing lisinopril given h/o CKD  - Follow up at next nursing home visit

## 2022-07-13 NOTE — LETTER
July 13, 2022     [facility]    Patient: Mary Thayer   YOB: 1940   Date of Visit: 7/13/2022       Dear Dr Nolan: Thank you for referring Mary Thayer to me for evaluation  Below are my notes for this consultation  If you have questions, please do not hesitate to call me  I look forward to following your patient along with you  Sincerely,        Boubacar Gray DO        CC: No Recipients  Boubacar Gray DO  7/13/2022  3:07 PM  Attested Addendum                                                             Nursing Home Visit    NAME: Mary Thayer  AGE: [de-identified] y o   SEX: male [id number]    DATE OF ENCOUNTER: 7/13/2022    Nursing home/assisted living name:  [address] [place] [address] [facility]  Patient room number:  369  Code status:  DNR (POLST available)      Assessment and Plan     Problem List Items Addressed This Visit        Digestive    Chronic GERD - Primary     - Patient denies symptoms, tolerating current medication  - Continue omeprazole 10mg QD              Cardiovascular and Mediastinum    Primary hypertension     - BP today 128/66  - BP at goal <150/90  - Pt denies headache, CP, SOB  - Tolerating current medications    Plan:  - Continue amlodipine 5mg QD & lisinopril 2 5mg QD  - Consider discontinuing lisinopril given h/o CKD  - Follow up at next nursing home visit           PAD (peripheral artery disease) ([facility] [address] )     - Pt denies symptoms, ambulates without pain  - Continue cilastazol 50mg Q12              Nervous and Auditory    Dementia without behavioral disturbance (HCC)     - Diagnosis per wife  - A&OX3 today              Genitourinary    CKD (chronic kidney disease)     - CKD per transition of care note  - Lab work obtained 6/13/22: Cr 1 32, GFR 55  - Unclear of baseline renal function  - Avoid nephrotoxic agents and renally dose medications  - Of note, consider discontinuing lisinopril given BP at goal              Other    Depression     - On 6/6/22, Brenda Lady displaying exit-seeking tendencies wanting to "get out of here"  - On 6/8/22, Dr Ramakrishna Gallego gave verbal order for a wander guard due to exit seeking tendencies  - Patient now residing in [facility] for monitoring  - Patient continues to express desire to go home to his wife  He is aware of his wife's concerns, but wants to have another trial of living at home  Plan:  - Continue monitoring in [facility]  - Continue sertraline               Dyslipidemia     - Lipid panel obtained 6/13/22: LDL 48, HDL 67, TG 63  - Current regimen: Atorvastatin 40mg QD    Plan:  - Decrease atorvastatin 40mg QD to 20mg QD given lipid panel results           Iron deficiency anemia secondary to inadequate dietary iron intake     - CBC from 6/13/22: Hgb 10 2  - Unsure of baseline Hgb  - Patient asymptomatic today    Plan:  - Continue ferrous sulfate  Patient denies side effects           Ambulatory dysfunction     - Patient reports doing well with PT sessions  - Makes it a habit of taking walks on patio and doing leg lifts in his chair 4-5x/day  - Took walk with patient, ambulates very well with walker    Plan:  - Continue PT  - Continue ambulation with walker           Conjunctivitis     - Original complaint 5/15/22 of unilateral L eye irritation, eye tearing   - Began ciprofloxacin eye drops on 5/17/22  - No eye redness or tearing today on physical exam, pt has no eye complaints    Plan:  - Discontinue ciprofloxacin           Vitamin B12 deficiency     - 6/13/22 lab work: vitamin B12 289    Plan:  - Begin vitamin B12 1000mcg QD                 No orders of the defined types were placed in this encounter  Chief Complaint     Chief Complaint   Patient presents with    Well Check       History of Present Illness     [de-identified] male PMH dementia, HTN, HLD, CKD, PVD, GERD, depression who is being evaluated today for a routine nursing home visit  He was last evaluated by Dr Janie Swan on 5/25/22   He denies headache today, which is controlled with Tylenol  Since last visit, lab work was completed on 6/13/22  CBC, CMP, lipid panel, TSH, and vitamin B12 level was obtained  On 6/6/22, pt attempted to escape nursing home  On 6/8/22, Dr Jemima Bey gave verbal order for a wander guard due to exit seeking tendencies  Pt now residing in [facility] for monitoring  Patient continues to express desire to go home to his wife  He is aware of his wife's concerns, but wants to have another trial of living at home  The following portions of the patient's history were reviewed and updated as appropriate: allergies, current medications, past family history, past medical history, past social history, past surgical history and problem list     Review of Systems     Review of Systems   Constitutional: Negative for chills and fever  HENT: Negative for congestion  Eyes: Negative for pain, discharge, redness and visual disturbance  Respiratory: Negative for cough and shortness of breath  Cardiovascular: Negative for chest pain  Gastrointestinal: Negative for abdominal pain  Genitourinary: Negative  Skin: Negative  Allergic/Immunologic: Negative for environmental allergies and food allergies  Neurological: Negative for headaches  Psychiatric/Behavioral: Negative for sleep disturbance  Active Problem List     Patient Active Problem List   Diagnosis    Primary hypertension    Chronic GERD    Depression    Takes dietary supplements    PAD (peripheral artery disease) (HCC)    Dyslipidemia    CKD (chronic kidney disease)    Dementia without behavioral disturbance (HCC)    Iron deficiency anemia secondary to inadequate dietary iron intake    Chronic nonintractable headache    Anemia of chronic disease    Ambulatory dysfunction    Conjunctivitis    Vitamin B12 deficiency         Current Medications   Medications reviewed and updated in facility chart        Current Outpatient Medications:    acetaminophen (TYLENOL) 500 mg tablet, Take 500 mg by mouth in the morning and 500 mg before bedtime  , Disp: , Rfl:     amLODIPine (NORVASC) 5 mg tablet, Take 1 tablet (5 mg total) by mouth in the morning , Disp: 30 tablet, Rfl: 0    atorvastatin (LIPITOR) 40 mg tablet, Take 1 tablet (40 mg total) by mouth in the morning , Disp: 90 tablet, Rfl: 2    cilostazol (PLETAL) 50 mg tablet, Take 1 tablet (50 mg total) by mouth in the morning and 1 tablet (50 mg total) in the evening , Disp: 60 tablet, Rfl: 0    ciprofloxacin (CILOXAN) 0 3 % ophthalmic ointment, 4 (four) times a day, Disp: , Rfl:     ferrous sulfate (FeroSul) 325 (65 Fe) mg tablet, Take 1 tablet (325 mg total) by mouth daily with breakfast, Disp: 90 tablet, Rfl: 0    folic acid (KP Folic Acid) 1 mg tablet, Take 1 tablet (1 mg total) by mouth in the morning , Disp: 90 tablet, Rfl: 3    lisinopril (ZESTRIL) 2 5 mg tablet, Take 1 tablet (2 5 mg total) by mouth in the morning , Disp: 90 tablet, Rfl: 3    omeprazole (PriLOSEC) 10 mg delayed release capsule, Take 1 capsule (10 mg total) by mouth in the morning , Disp: 30 capsule, Rfl: 0    sertraline (Zoloft) 50 mg tablet, Take 1 tablet (50 mg total) by mouth in the morning , Disp: 30 tablet, Rfl: 0     Objective     /66   Pulse 71   Temp (!) 97 1 °F (36 2 °C)   Ht 5' 7" (1 702 m)   Wt 58 5 kg (129 lb)   BMI 20 20 kg/m²     Physical Exam  Constitutional:       General: He is not in acute distress  Appearance: Normal appearance  He is not ill-appearing  HENT:      Head: Normocephalic and atraumatic  Nose: Nose normal       Mouth/Throat:      Mouth: Mucous membranes are moist    Eyes:      General: No scleral icterus  Right eye: No discharge  Left eye: No discharge  Cardiovascular:      Rate and Rhythm: Normal rate and regular rhythm  Pulses: Normal pulses  Heart sounds: Normal heart sounds  No murmur heard    Pulmonary:      Effort: Pulmonary effort is normal       Breath sounds: Normal breath sounds  No wheezing or rales  Abdominal:      General: Bowel sounds are normal  There is no distension  Palpations: Abdomen is soft  Tenderness: There is no abdominal tenderness  There is no guarding  Musculoskeletal:         General: No swelling  Cervical back: Neck supple  No tenderness  Skin:     General: Skin is warm and dry  Neurological:      Mental Status: He is alert and oriented to person, place, and time  Psychiatric:         Mood and Affect: Mood normal          Behavior: Behavior normal          Pertinent Laboratory/Diagnostic Studies:  See 6/13/22 external lab work  Radu Sifuentes DO PGY-2            Attestation signed by Navin Rudolph MD at 7/13/2022  3:29 PM:  I discussed case with the resident and reviewed resident note  I was present at assisted living and supervised the resident  I agree with the resident management as it was presented to me  Denies headache, ambulates with walker  BP controlled with current medication  Will decrease Atorvastatin to 20 mg daily, start B12 1000 mcg daily  Will repeat CBC in 1-2 months

## 2022-07-13 NOTE — ASSESSMENT & PLAN NOTE
- CBC from 6/13/22: Hgb 10 2  - Unsure of baseline Hgb  - Patient asymptomatic today    Plan:  - Continue ferrous sulfate   Patient denies side effects

## 2022-07-13 NOTE — PROGRESS NOTES
Nursing Home Visit    NAME: Mary Thayer  AGE: [de-identified] y o  SEX: male [id number]    DATE OF ENCOUNTER: 7/13/2022    Nursing home/assisted living name:  [facility]  Patient room number:  487  Code status:  DNR (POLST available)      Assessment and Plan     Problem List Items Addressed This Visit        Digestive    Chronic GERD - Primary     - Patient denies symptoms, tolerating current medication  - Continue omeprazole 10mg QD              Cardiovascular and Mediastinum    Primary hypertension     - BP today 128/66  - BP at goal <150/90  - Pt denies headache, CP, SOB  - Tolerating current medications    Plan:  - Continue amlodipine 5mg QD & lisinopril 2 5mg QD  - Consider discontinuing lisinopril given h/o CKD  - Follow up at next nursing home visit           PAD (peripheral artery disease) (HCC)     - Pt denies symptoms, ambulates without pain  - Continue cilastazol 50mg Q12              Nervous and Auditory    Dementia without behavioral disturbance (HCC)     - Diagnosis per wife  - A&OX3 today              Genitourinary    CKD (chronic kidney disease)     - CKD per transition of care note  - Lab work obtained 6/13/22: Cr 1 32, GFR 55  - Unclear of baseline renal function  - Avoid nephrotoxic agents and renally dose medications  - Of note, consider discontinuing lisinopril given BP at goal              Other    Depression     - On 6/6/22, Julisa Ji displaying exit-seeking tendencies wanting to "get out of here"  - On 6/8/22, Dr Joelle Saldivar gave verbal order for a wander guard due to exit seeking tendencies  - Patient now residing in [facility] [place] for monitoring  - Patient continues to express desire to go home to his wife  He is aware of his wife's concerns, but wants to have another trial of living at home      Plan:  - Continue monitoring in [facility]  - Continue sertraline               Dyslipidemia     - Lipid panel obtained 6/13/22: LDL 48, HDL 67, TG 63  - Current regimen: Atorvastatin 40mg QD    Plan:  - Decrease atorvastatin 40mg QD to 20mg QD given lipid panel results           Iron deficiency anemia secondary to inadequate dietary iron intake     - CBC from 6/13/22: Hgb 10 2  - Unsure of baseline Hgb  - Patient asymptomatic today    Plan:  - Continue ferrous sulfate  Patient denies side effects           Ambulatory dysfunction     - Patient reports doing well with PT sessions  - Makes it a habit of taking walks on patio and doing leg lifts in his chair 4-5x/day  - Took walk with patient, ambulates very well with walker    Plan:  - Continue PT  - Continue ambulation with walker           Conjunctivitis     - Original complaint 5/15/22 of unilateral L eye irritation, eye tearing   - Began ciprofloxacin eye drops on 5/17/22  - No eye redness or tearing today on physical exam, pt has no eye complaints    Plan:  - Discontinue ciprofloxacin           Vitamin B12 deficiency     - 6/13/22 lab work: vitamin B12 289    Plan:  - Begin vitamin B12 1000mcg QD                 No orders of the defined types were placed in this encounter  Chief Complaint     Chief Complaint   Patient presents with    Well Check       History of Present Illness     [de-identified] male PMH dementia, HTN, HLD, CKD, PVD, GERD, depression who is being evaluated today for a routine nursing home visit  He was last evaluated by Dr Horace Melchor on 5/25/22  He denies headache today, which is controlled with Tylenol  Since last visit, lab work was completed on 6/13/22  CBC, CMP, lipid panel, TSH, and vitamin B12 level was obtained  On 6/6/22, pt attempted to escape nursing home  On 6/8/22, Dr Jerilyn Ann gave verbal order for a wander guard due to exit seeking tendencies  Pt now residing in [facility] for monitoring  Patient continues to express desire to go home to his wife   He is aware of his wife's concerns, but wants to have another trial of living at home     The following portions of the patient's history were reviewed and updated as appropriate: allergies, current medications, past family history, past medical history, past social history, past surgical history and problem list     Review of Systems     Review of Systems   Constitutional: Negative for chills and fever  HENT: Negative for congestion  Eyes: Negative for pain, discharge, redness and visual disturbance  Respiratory: Negative for cough and shortness of breath  Cardiovascular: Negative for chest pain  Gastrointestinal: Negative for abdominal pain  Genitourinary: Negative  Skin: Negative  Allergic/Immunologic: Negative for environmental allergies and food allergies  Neurological: Negative for headaches  Psychiatric/Behavioral: Negative for sleep disturbance  Active Problem List     Patient Active Problem List   Diagnosis    Primary hypertension    Chronic GERD    Depression    Takes dietary supplements    [facility]AD [facility]) ([facility])    Dyslipidemia    CKD (chronic kidney disease)    Dementia without behavioral disturbance ([facility])    Iron deficiency anemia secondary to inadequate dietary iron intake    Chronic nonintractable headache    Anemia of chronic disease    Ambulatory dysfunction    Conjunctivitis    Vitamin B12 deficiency         Current Medications   Medications reviewed and updated in facility chart  Current Outpatient Medications:     acetaminophen (TYLENOL) 500 mg tablet, Take 500 mg by mouth in the morning and 500 mg before bedtime  , Disp: , Rfl:     amLODIPine (NORVASC) 5 mg tablet, Take 1 tablet (5 mg total) by mouth in the morning , Disp: 30 tablet, Rfl: 0    atorvastatin (LIPITOR) 40 mg tablet, Take 1 tablet (40 mg total) by mouth in the morning , Disp: 90 tablet, Rfl: 2    cilostazol (PLETAL) 50 mg tablet, Take 1 tablet (50 mg total) by mouth in the morning and 1 tablet (50 mg total) in the evening , Disp: 60 tablet, Rfl: 0    ciprofloxacin (CILOXAN) 0 3 % ophthalmic ointment, 4 (four) times a day, Disp: , Rfl:     ferrous sulfate (FeroSul) 325 (65 Fe) mg tablet, Take 1 tablet (325 mg total) by mouth daily with breakfast, Disp: 90 tablet, Rfl: 0    folic acid (KP Folic Acid) 1 mg tablet, Take 1 tablet (1 mg total) by mouth in the morning , Disp: 90 tablet, Rfl: 3    lisinopril (ZESTRIL) 2 5 mg tablet, Take 1 tablet (2 5 mg total) by mouth in the morning , Disp: 90 tablet, Rfl: 3    omeprazole (PriLOSEC) 10 mg delayed release capsule, Take 1 capsule (10 mg total) by mouth in the morning , Disp: 30 capsule, Rfl: 0    sertraline (Zoloft) 50 mg tablet, Take 1 tablet (50 mg total) by mouth in the morning , Disp: 30 tablet, Rfl: 0     Objective     /66   Pulse 71   Temp (!) 97 1 °F (36 2 °C)   Ht 5' 7" (1 702 m)   Wt 58 5 kg (129 lb)   BMI 20 20 kg/m²     Physical Exam  Constitutional:       General: He is not in acute distress  Appearance: Normal appearance  He is not ill-appearing  HENT:      Head: Normocephalic and atraumatic  Nose: Nose normal       Mouth/Throat:      Mouth: Mucous membranes are moist    Eyes:      General: No scleral icterus  Right eye: No discharge  Left eye: No discharge  Cardiovascular:      Rate and Rhythm: Normal rate and regular rhythm  Pulses: Normal pulses  Heart sounds: Normal heart sounds  No murmur heard  Pulmonary:      Effort: Pulmonary effort is normal       Breath sounds: Normal breath sounds  No wheezing or rales  Abdominal:      General: Bowel sounds are normal  There is no distension  Palpations: Abdomen is soft  Tenderness: There is no abdominal tenderness  There is no guarding  Musculoskeletal:         General: No swelling  Cervical back: Neck supple  No tenderness  Skin:     General: Skin is warm and dry  Neurological:      Mental Status: He is alert and oriented to person, place, and time     Psychiatric:         Mood and Affect: Mood normal          Behavior: Behavior normal          Pertinent Laboratory/Diagnostic Studies:  See 6/13/22 external lab work        Forest Kapoor DO PGY-2

## 2022-07-13 NOTE — ASSESSMENT & PLAN NOTE
- On 6/6/22, Margo Ag displaying exit-seeking tendencies wanting to "get out of here"  - On 6/8/22, Dr Dolly Gowers gave verbal order for a wander guard due to exit seeking tendencies  - Patient now residing in [facility] for monitoring  - Patient continues to express desire to go home to his wife  He is aware of his wife's concerns, but wants to have another trial of living at home      Plan:  - Continue monitoring in [facility]  - Continue sertraline

## 2022-07-13 NOTE — ASSESSMENT & PLAN NOTE
- Lipid panel obtained 6/13/22: LDL 48, HDL 67, TG 63  - Current regimen: Atorvastatin 40mg QD    Plan:  - Decrease atorvastatin 40mg QD to 20mg QD given lipid panel results

## 2022-07-13 NOTE — ASSESSMENT & PLAN NOTE
- Original complaint 5/15/22 of unilateral L eye irritation, eye tearing   - Began ciprofloxacin eye drops on 5/17/22  - No eye redness or tearing today on physical exam, pt has no eye complaints    Plan:  - Discontinue ciprofloxacin

## 2022-08-23 ENCOUNTER — TELEPHONE (OUTPATIENT)
Dept: FAMILY MEDICINE CLINIC | Facility: CLINIC | Age: 82
End: 2022-08-23

## 2022-10-11 PROBLEM — H10.9 CONJUNCTIVITIS: Status: RESOLVED | Noted: 2022-07-13 | Resolved: 2022-10-11
